# Patient Record
Sex: FEMALE | Race: WHITE | NOT HISPANIC OR LATINO | URBAN - METROPOLITAN AREA
[De-identification: names, ages, dates, MRNs, and addresses within clinical notes are randomized per-mention and may not be internally consistent; named-entity substitution may affect disease eponyms.]

---

## 2017-08-04 NOTE — PATIENT DISCUSSION
AMD (DRY), OS:  PRESCRIBE AREDS 2 VITAMINS AND UV PROTECTION TO SLOW PROGRESSION. SMOKING CESSATION EMPHASIZED. PRESCRIBE REGULAR AMSLER GRID CHECKS TO SELF MONITOR. PATIENT TO CALL WITH CHANGES FOR RETINA CONSULT. RETURN FOR FOLLOW-UP AS SCHEDULED.

## 2017-08-04 NOTE — PATIENT DISCUSSION
SOLOMON OU:  PRESCRIBED UV PROTECTION TO SLOW GROWTH. PRESCRIBE ARTIFICAL TEARS TO INCREASE COMFORT.

## 2021-11-29 ENCOUNTER — CATARACT CONSULT (OUTPATIENT)
Dept: URBAN - METROPOLITAN AREA CLINIC 10 | Facility: CLINIC | Age: 71
End: 2021-11-29

## 2021-11-29 DIAGNOSIS — H25.813: ICD-10-CM

## 2021-11-29 PROCEDURE — 92012 INTRM OPH EXAM EST PATIENT: CPT

## 2021-11-29 ASSESSMENT — VISUAL ACUITY
OU_SC: 20/50-2
OU_CC: 20/25+2
OS_SC: 20/40-2
OS_CC: 20/30-2
OD_CC: 20/25+2
OD_SC: 20/60

## 2022-03-01 ENCOUNTER — ESTABLISHED PATIENT (OUTPATIENT)
Dept: URBAN - METROPOLITAN AREA CLINIC 7 | Facility: CLINIC | Age: 72
End: 2022-03-01

## 2022-03-01 DIAGNOSIS — H52.03: ICD-10-CM

## 2022-03-01 DIAGNOSIS — D31.31: ICD-10-CM

## 2022-03-01 PROCEDURE — 92250 FUNDUS PHOTOGRAPHY W/I&R: CPT

## 2022-03-01 PROCEDURE — 92014 COMPRE OPH EXAM EST PT 1/>: CPT

## 2022-03-01 PROCEDURE — 92015 DETERMINE REFRACTIVE STATE: CPT

## 2022-03-01 ASSESSMENT — TONOMETRY
OS_IOP_MMHG: 13
OD_IOP_MMHG: 15

## 2022-03-01 ASSESSMENT — VISUAL ACUITY
OD_CC: 20/30
OU_SC: 20/40-1
OU_CC: 20/30
OS_SC: 20/30-1
OD_SC: 20/40-1
OS_CC: 20/40-1

## 2022-03-01 ASSESSMENT — KERATOMETRY
OD_K1POWER_DIOPTERS: 43.00
OD_AXISANGLE_DEGREES: 84
OS_K2POWER_DIOPTERS: 44.00
OS_AXISANGLE2_DEGREES: 31
OD_AXISANGLE2_DEGREES: 174
OD_K2POWER_DIOPTERS: 43.75
OS_AXISANGLE_DEGREES: 121
OS_K1POWER_DIOPTERS: 43.50

## 2022-10-28 ASSESSMENT — KERATOMETRY
OS_K2POWER_DIOPTERS: 44.00
OS_AXISANGLE2_DEGREES: 31
OS_AXISANGLE_DEGREES: 121
OD_AXISANGLE_DEGREES: 84
OS_K1POWER_DIOPTERS: 43.50
OD_AXISANGLE2_DEGREES: 174
OD_K1POWER_DIOPTERS: 43.00
OD_K2POWER_DIOPTERS: 43.75

## 2022-10-31 ENCOUNTER — CONSULTATION/EVALUATION (OUTPATIENT)
Dept: URBAN - METROPOLITAN AREA CLINIC 10 | Facility: CLINIC | Age: 72
End: 2022-10-31

## 2022-10-31 DIAGNOSIS — H25.813: ICD-10-CM

## 2022-10-31 PROCEDURE — 99213 OFFICE O/P EST LOW 20 MIN: CPT

## 2022-10-31 ASSESSMENT — VISUAL ACUITY
OS_CC: 20/50
OU_CC: 20/40+2
OS_PH: 20/40
OD_CC: 20/40+2

## 2023-11-07 ASSESSMENT — KERATOMETRY
OD_AXISANGLE_DEGREES: 100
OD_K2POWER_DIOPTERS: 43.75
OS_AXISANGLE_DEGREES: 90
OD_AXISANGLE2_DEGREES: 10
OS_K2POWER_DIOPTERS: 43.75
OS_K1POWER_DIOPTERS: 43.25
OS_AXISANGLE2_DEGREES: 180
OD_K1POWER_DIOPTERS: 42.50

## 2023-11-08 ENCOUNTER — CONSULTATION/EVALUATION (OUTPATIENT)
Dept: URBAN - METROPOLITAN AREA CLINIC 10 | Facility: CLINIC | Age: 73
End: 2023-11-08

## 2023-11-08 DIAGNOSIS — H25.813: ICD-10-CM

## 2023-11-08 PROCEDURE — 99213 OFFICE O/P EST LOW 20 MIN: CPT

## 2023-11-08 ASSESSMENT — VISUAL ACUITY
OD_CC: 20/40-2
OU_CC: 20/40-2
OS_CC: 20/70+2

## 2023-12-05 ENCOUNTER — ESTABLISHED PATIENT (OUTPATIENT)
Facility: LOCATION | Age: 73
End: 2023-12-05

## 2023-12-05 DIAGNOSIS — H52.7: ICD-10-CM

## 2023-12-05 DIAGNOSIS — H25.13: ICD-10-CM

## 2023-12-05 PROCEDURE — 92136 OPHTHALMIC BIOMETRY: CPT

## 2023-12-05 PROCEDURE — 99214 OFFICE O/P EST MOD 30 MIN: CPT

## 2023-12-05 ASSESSMENT — KERATOMETRY
OS_AXISANGLE2_DEGREES: 177
OD_K2POWER_DIOPTERS: 43.50
OS_K1POWER_DIOPTERS: 42.75
OS_AXISANGLE_DEGREES: 87
OD_K1POWER_DIOPTERS: 42.50
OD_AXISANGLE2_DEGREES: 7
OD_AXISANGLE_DEGREES: 97
OS_K2POWER_DIOPTERS: 43.75

## 2023-12-05 ASSESSMENT — VISUAL ACUITY
OD_BCVA: 20/40
OD_CC: 20/70
OS_BCVA: 20/40
OS_CC: 20/80

## 2023-12-05 ASSESSMENT — TONOMETRY
OS_IOP_MMHG: 14
OD_IOP_MMHG: 13

## 2024-01-24 ENCOUNTER — POST-OP (OUTPATIENT)
Dept: URBAN - METROPOLITAN AREA CLINIC 10 | Facility: CLINIC | Age: 74
End: 2024-01-24

## 2024-01-24 DIAGNOSIS — Z96.1: ICD-10-CM

## 2024-01-24 PROCEDURE — 99024 POSTOP FOLLOW-UP VISIT: CPT

## 2024-01-24 ASSESSMENT — TONOMETRY: OD_IOP_MMHG: 18

## 2024-01-24 ASSESSMENT — VISUAL ACUITY
OD_SC: 20/30-1
OS_SC: 20/40-2
OU_SC: 20/25

## 2024-01-31 ENCOUNTER — POST-OP (OUTPATIENT)
Dept: URBAN - METROPOLITAN AREA CLINIC 10 | Facility: CLINIC | Age: 74
End: 2024-01-31

## 2024-01-31 DIAGNOSIS — Z96.1: ICD-10-CM

## 2024-01-31 PROCEDURE — 99024 POSTOP FOLLOW-UP VISIT: CPT

## 2024-01-31 ASSESSMENT — TONOMETRY
OD_IOP_MMHG: 13
OS_IOP_MMHG: 13

## 2024-01-31 ASSESSMENT — VISUAL ACUITY
OD_SC: 20/25-1
OS_SC: 20/30
OU_SC: 20/20-1

## 2024-02-14 ENCOUNTER — POST-OP (OUTPATIENT)
Dept: URBAN - METROPOLITAN AREA CLINIC 10 | Facility: CLINIC | Age: 74
End: 2024-02-14

## 2024-02-14 VITALS — DIASTOLIC BLOOD PRESSURE: 84 MMHG | HEIGHT: 55 IN | HEART RATE: 85 BPM | SYSTOLIC BLOOD PRESSURE: 134 MMHG

## 2024-02-14 DIAGNOSIS — Z96.1: ICD-10-CM

## 2024-02-14 PROCEDURE — 99024 POSTOP FOLLOW-UP VISIT: CPT

## 2024-02-14 PROCEDURE — 92015 DETERMINE REFRACTIVE STATE: CPT

## 2024-02-14 ASSESSMENT — KERATOMETRY
OD_K2POWER_DIOPTERS: 43.75
OS_AXISANGLE2_DEGREES: 177
OS_K1POWER_DIOPTERS: 43.25
OS_K2POWER_DIOPTERS: 43.50
OD_K1POWER_DIOPTERS: 43.75
OS_AXISANGLE_DEGREES: 87
OD_AXISANGLE2_DEGREES: 1
OD_AXISANGLE_DEGREES: 91

## 2024-02-14 ASSESSMENT — VISUAL ACUITY
OU_SC: 20/20
OS_SC: 20/30-1
OD_SC: 20/30-1

## 2024-02-14 ASSESSMENT — TONOMETRY
OS_IOP_MMHG: 14
OD_IOP_MMHG: 19

## 2024-03-11 ENCOUNTER — ESTABLISHED PATIENT (OUTPATIENT)
Dept: URBAN - METROPOLITAN AREA CLINIC 10 | Facility: CLINIC | Age: 74
End: 2024-03-11

## 2024-03-11 DIAGNOSIS — H43.813: ICD-10-CM

## 2024-03-11 DIAGNOSIS — Z96.1: ICD-10-CM

## 2024-03-11 DIAGNOSIS — D31.31: ICD-10-CM

## 2024-03-11 DIAGNOSIS — H52.03: ICD-10-CM

## 2024-03-11 PROCEDURE — 92014 COMPRE OPH EXAM EST PT 1/>: CPT

## 2024-03-11 PROCEDURE — 92015 DETERMINE REFRACTIVE STATE: CPT

## 2024-03-11 PROCEDURE — 92250 FUNDUS PHOTOGRAPHY W/I&R: CPT

## 2024-03-11 ASSESSMENT — KERATOMETRY
OS_AXISANGLE_DEGREES: 85
OD_AXISANGLE2_DEGREES: 6
OD_AXISANGLE_DEGREES: 96
OS_K1POWER_DIOPTERS: 43.00
OS_K2POWER_DIOPTERS: 43.75
OS_AXISANGLE2_DEGREES: 175
OD_K1POWER_DIOPTERS: 42.50
OD_K2POWER_DIOPTERS: 43.50

## 2024-03-11 ASSESSMENT — TONOMETRY
OD_IOP_MMHG: 10
OS_IOP_MMHG: 9

## 2024-03-11 ASSESSMENT — VISUAL ACUITY
OU_SC: 20/30
OS_SC: 20/30-1
OD_SC: 20/30

## 2025-03-11 ENCOUNTER — COMPREHENSIVE EXAM (OUTPATIENT)
Age: 75
End: 2025-03-11

## 2025-03-11 DIAGNOSIS — H52.03: ICD-10-CM

## 2025-03-11 DIAGNOSIS — D31.31: ICD-10-CM

## 2025-03-11 PROCEDURE — 92015 DETERMINE REFRACTIVE STATE: CPT

## 2025-03-11 PROCEDURE — 92250 FUNDUS PHOTOGRAPHY W/I&R: CPT

## 2025-03-11 PROCEDURE — 92014 COMPRE OPH EXAM EST PT 1/>: CPT
